# Patient Record
Sex: FEMALE | Race: WHITE | URBAN - METROPOLITAN AREA
[De-identification: names, ages, dates, MRNs, and addresses within clinical notes are randomized per-mention and may not be internally consistent; named-entity substitution may affect disease eponyms.]

---

## 2024-09-25 ENCOUNTER — OFFICE VISIT (OUTPATIENT)
Dept: GERIATRIC MEDICINE | Age: 74
End: 2024-09-25
Payer: MEDICARE

## 2024-09-25 DIAGNOSIS — E03.9 ACQUIRED HYPOTHYROIDISM: ICD-10-CM

## 2024-09-25 DIAGNOSIS — D69.6 THROMBOCYTOPENIA, UNSPECIFIED (HCC): Primary | ICD-10-CM

## 2024-09-25 PROCEDURE — 99344 HOME/RES VST NEW MOD MDM 60: CPT | Performed by: INTERNAL MEDICINE

## 2024-09-25 PROCEDURE — 1090F PRES/ABSN URINE INCON ASSESS: CPT | Performed by: INTERNAL MEDICINE

## 2024-09-25 PROCEDURE — 3017F COLORECTAL CA SCREEN DOC REV: CPT | Performed by: INTERNAL MEDICINE

## 2024-09-25 PROCEDURE — 4004F PT TOBACCO SCREEN RCVD TLK: CPT | Performed by: INTERNAL MEDICINE

## 2024-09-25 PROCEDURE — 1123F ACP DISCUSS/DSCN MKR DOCD: CPT | Performed by: INTERNAL MEDICINE

## 2024-09-25 PROCEDURE — G8421 BMI NOT CALCULATED: HCPCS | Performed by: INTERNAL MEDICINE

## 2024-09-26 PROBLEM — F41.9 ANXIETY: Status: ACTIVE | Noted: 2024-09-26

## 2024-09-26 PROBLEM — F32.A DEPRESSION: Status: ACTIVE | Noted: 2024-09-26

## 2024-09-26 PROBLEM — E03.9 ACQUIRED HYPOTHYROIDISM: Status: ACTIVE | Noted: 2024-09-26

## 2024-09-27 RX ORDER — MEMANTINE HYDROCHLORIDE 10 MG/1
10 TABLET ORAL 2 TIMES DAILY
COMMUNITY

## 2024-09-27 RX ORDER — ALPRAZOLAM 0.25 MG
0.25 TABLET ORAL 3 TIMES DAILY
COMMUNITY

## 2024-09-27 RX ORDER — ASPIRIN 325 MG
325 TABLET ORAL EVERY 4 HOURS PRN
COMMUNITY

## 2024-09-27 RX ORDER — ASPIRIN/CAFFEINE 400MG-32MG
1 TABLET ORAL NIGHTLY
COMMUNITY

## 2024-09-27 RX ORDER — FOLIC ACID 1 MG/1
1 TABLET ORAL DAILY
COMMUNITY

## 2024-09-27 RX ORDER — DONEPEZIL HYDROCHLORIDE 10 MG/1
10 TABLET, FILM COATED ORAL NIGHTLY
COMMUNITY

## 2024-09-27 RX ORDER — VILAZODONE HYDROCHLORIDE 40 MG/1
40 TABLET ORAL DAILY
COMMUNITY

## 2024-09-27 RX ORDER — QUETIAPINE FUMARATE 200 MG/1
200 TABLET, FILM COATED ORAL NIGHTLY
COMMUNITY

## 2024-09-27 RX ORDER — ACETAMINOPHEN 160 MG
2000 TABLET,DISINTEGRATING ORAL EVERY MORNING
COMMUNITY

## 2024-09-27 RX ORDER — LEVOTHYROXINE SODIUM 25 UG/1
25 TABLET ORAL DAILY
COMMUNITY

## 2024-09-27 RX ORDER — DIPHENHYDRAMINE HCL 25 MG
25 TABLET ORAL EVERY 6 HOURS PRN
COMMUNITY

## 2024-09-27 RX ORDER — ANTIARTHRITIC COMBINATION NO.2 900 MG
1 TABLET ORAL
COMMUNITY

## 2024-10-10 LAB
BASOPHILS ABSOLUTE: NORMAL
BASOPHILS RELATIVE PERCENT: 0.5 %
BUN BLDV-MCNC: 18 MG/DL
CALCIUM SERPL-MCNC: 10.2 MG/DL
CHLORIDE BLD-SCNC: 105 MMOL/L
CO2: 28 MMOL/L
CREAT SERPL-MCNC: 0.9 MG/DL
EGFR: 61
EOSINOPHILS ABSOLUTE: 0.2 /ΜL
EOSINOPHILS RELATIVE PERCENT: 5.1 %
GLUCOSE BLD-MCNC: 87 MG/DL
HCT VFR BLD CALC: 37.7 % (ref 36–46)
HEMOGLOBIN: 12.5 G/DL (ref 12–16)
LYMPHOCYTES ABSOLUTE: 1.1 /ΜL
LYMPHOCYTES RELATIVE PERCENT: 25.9 %
MCH RBC QN AUTO: 29.9 PG
MCHC RBC AUTO-ENTMCNC: 33.1 G/DL
MCV RBC AUTO: 90.4 FL
MONOCYTES ABSOLUTE: 0.4 /ΜL
MONOCYTES RELATIVE PERCENT: 10.5 %
NEUTROPHILS ABSOLUTE: 2.5 /ΜL
NEUTROPHILS RELATIVE PERCENT: 58 %
PLATELET # BLD: 124 K/ΜL
PMV BLD AUTO: 10.2 FL
POTASSIUM SERPL-SCNC: 4.4 MMOL/L
RBC # BLD: 4.17 10^6/ΜL
SODIUM BLD-SCNC: 140 MMOL/L
TSH SERPL DL<=0.05 MIU/L-ACNC: 2.28 UIU/ML
WBC # BLD: 4.2 10^3/ML

## 2024-10-24 PROBLEM — D69.6 THROMBOCYTOPENIA, UNSPECIFIED (HCC): Status: ACTIVE | Noted: 2024-10-24

## 2024-10-25 NOTE — PROGRESS NOTES
Patient Name: Fatuma Carbajal  Date: 10/24/2024  YOB: 1950  Medical Record Number: [id number]              History of Present Illness:      Review of Systems    Review of Systems: All 14 review of systems negative other than as stated above    Social History     Tobacco Use    Smoking status: Never   Substance Use Topics    Drug use: No         Past Medical History:   Diagnosis Date    Closed fracture of metatarsal bone            Past Surgical History:   Procedure Laterality Date    [place] DILATION/CURETTAGE,DIAGNOSTIC      : D&C     REMOVE TONSILS/ADENOIDS,<13 Y/O      : T & A          No current outpatient medications on file prior to visit.     No current facility-administered medications on file prior to visit.       Allergies   Allergen Reactions    Tramadol          Family History   Problem Relation Age of Onset    Diabetes Mother          Physical Exam:      Physical Exam    There were no vitals taken for this visit.      .   Lab Results   Component Value Date    WBC 4.2 10/10/2024    HGB 12.5 10/10/2024    HCT 37.7 10/10/2024    MCV 90.4 10/10/2024     10/10/2024     Lab Results   Component Value Date/Time     10/10/2024 02:13 PM    K 4.4 10/10/2024 02:13 PM     10/10/2024 02:13 PM    CO2 28 10/10/2024 02:13 PM    BUN 18 10/10/2024 02:13 PM    CREATININE 0.9 10/10/2024 02:13 PM    GLUCOSE 87 10/10/2024 02:13 PM    CALCIUM 10.2 10/10/2024 02:13 PM    LABGLOM 61 10/10/2024 02:13 PM                ASSESSMENT:  Patient Active Problem List   Diagnosis    Anxiety    Depression    Acquired hypothyroidism    Thrombocytopenia, unspecified (HCC)         PLAN:   Diagnosis Orders   1. Thrombocytopenia, unspecified (HCC)        2. Acquired hypothyroidism              Please note orders entered on site at facility after discussion with appropriate facility nursing/therapy/ / nutritional staff. Current longstanding medical problems and acute medical issues addressed with

## 2024-11-14 ENCOUNTER — OFFICE VISIT (OUTPATIENT)
Dept: GERIATRIC MEDICINE | Age: 74
End: 2024-11-14

## 2024-11-14 VITALS
DIASTOLIC BLOOD PRESSURE: 82 MMHG | HEIGHT: 66 IN | BODY MASS INDEX: 24.27 KG/M2 | RESPIRATION RATE: 16 BRPM | OXYGEN SATURATION: 97 % | HEART RATE: 76 BPM | TEMPERATURE: 96.9 F | WEIGHT: 151 LBS | SYSTOLIC BLOOD PRESSURE: 116 MMHG

## 2024-11-14 DIAGNOSIS — Z00.00 MEDICARE ANNUAL WELLNESS VISIT, SUBSEQUENT: Primary | ICD-10-CM

## 2024-11-14 RX ORDER — TRAZODONE HYDROCHLORIDE 100 MG/1
300 TABLET ORAL NIGHTLY
Qty: 90 TABLET | Refills: 1
Start: 2024-11-14

## 2024-11-14 ASSESSMENT — PATIENT HEALTH QUESTIONNAIRE - PHQ9
SUM OF ALL RESPONSES TO PHQ QUESTIONS 1-9: 3
3. TROUBLE FALLING OR STAYING ASLEEP: NOT AT ALL
7. TROUBLE CONCENTRATING ON THINGS, SUCH AS READING THE NEWSPAPER OR WATCHING TELEVISION: NOT AT ALL
SUM OF ALL RESPONSES TO PHQ QUESTIONS 1-9: 3
SUM OF ALL RESPONSES TO PHQ9 QUESTIONS 1 & 2: 3
SUM OF ALL RESPONSES TO PHQ QUESTIONS 1-9: 3
5. POOR APPETITE OR OVEREATING: NOT AT ALL
4. FEELING TIRED OR HAVING LITTLE ENERGY: NOT AT ALL
8. MOVING OR SPEAKING SO SLOWLY THAT OTHER PEOPLE COULD HAVE NOTICED. OR THE OPPOSITE, BEING SO FIGETY OR RESTLESS THAT YOU HAVE BEEN MOVING AROUND A LOT MORE THAN USUAL: NOT AT ALL
6. FEELING BAD ABOUT YOURSELF - OR THAT YOU ARE A FAILURE OR HAVE LET YOURSELF OR YOUR FAMILY DOWN: NOT AT ALL
9. THOUGHTS THAT YOU WOULD BE BETTER OFF DEAD, OR OF HURTING YOURSELF: NOT AT ALL
1. LITTLE INTEREST OR PLEASURE IN DOING THINGS: NOT AT ALL
2. FEELING DOWN, DEPRESSED OR HOPELESS: NEARLY EVERY DAY
SUM OF ALL RESPONSES TO PHQ QUESTIONS 1-9: 3
10. IF YOU CHECKED OFF ANY PROBLEMS, HOW DIFFICULT HAVE THESE PROBLEMS MADE IT FOR YOU TO DO YOUR WORK, TAKE CARE OF THINGS AT HOME, OR GET ALONG WITH OTHER PEOPLE: NOT DIFFICULT AT ALL

## 2024-11-14 ASSESSMENT — LIFESTYLE VARIABLES
HOW OFTEN DO YOU HAVE A DRINK CONTAINING ALCOHOL: MONTHLY OR LESS
HOW MANY STANDARD DRINKS CONTAINING ALCOHOL DO YOU HAVE ON A TYPICAL DAY: 1 OR 2

## 2024-11-14 NOTE — PROGRESS NOTES
Medicare Annual Wellness Visit    [facility]  [address] [place] [address]    Fatuma Carbajal is here for Medicare AWV    FULL CODE     Assessment & Plan   Medicare annual wellness visit, subsequent    Recommendations for Preventive Services Due: see orders and patient instructions/AVS.  Recommended screening schedule for the next 5-10 years is provided to the patient in written form: see Patient Instructions/AVS.     Return in 1 year (on 11/14/2025) for Medicare AWV.     Subjective     Will continue to be seen by psych for depression. Patient will f/u. Still drives, states is safe. She does not get lost.     Patient's complete Health Risk Assessment and screening values have been reviewed and are found in Flowsheets. The following problems were reviewed today and where indicated follow up appointments were made and/or referrals ordered.    No Positive Risk Factors identified today.                                Objective   Vitals:    11/14/24 1554   BP: 116/82   Pulse: 76   Resp: 16   Temp: 96.9 °F (36.1 °C)   SpO2: 97%   Weight: 68.5 kg (151 lb)   Height: 1.676 m (5' 6\")      Body mass index is 24.37 kg/m².                 Allergies   Allergen Reactions    Tramadol      Prior to Visit Medications    Medication Sig Taking? Authorizing Provider   traZODone (DESYREL) 100 MG tablet Take 3 tablets by mouth nightly Yes Cris Brennan, APRN - CNP   ALPRAZolam (XANAX) 0.25 MG tablet Take 1 tablet by mouth 3 times daily. Indications: Feeling Anxious Max Daily Amount: 0.75 mg  ProviderKathya MD   aspirin 325 MG tablet Take 1 tablet by mouth every 4 hours as needed for Pain  ProviderKathya MD   diphenhydrAMINE (BENADRYL) 25 MG tablet Take 1 tablet by mouth every 6 hours as needed for Allergies  ProviderKathay MD   Biotin 5000 MCG TABS Take 1 tablet by mouth Daily with lunch Indications: Nutritional Support  ProviderKathya MD   donepezil (ARICEPT) 10